# Patient Record
Sex: FEMALE | Race: WHITE | NOT HISPANIC OR LATINO | Employment: FULL TIME | URBAN - METROPOLITAN AREA
[De-identification: names, ages, dates, MRNs, and addresses within clinical notes are randomized per-mention and may not be internally consistent; named-entity substitution may affect disease eponyms.]

---

## 2023-10-30 ENCOUNTER — OCCUPATIONAL MEDICINE (OUTPATIENT)
Dept: URGENT CARE | Facility: PHYSICIAN GROUP | Age: 23
End: 2023-10-30
Payer: COMMERCIAL

## 2023-10-30 ENCOUNTER — APPOINTMENT (OUTPATIENT)
Dept: RADIOLOGY | Facility: IMAGING CENTER | Age: 23
End: 2023-10-30
Attending: PHYSICIAN ASSISTANT
Payer: COMMERCIAL

## 2023-10-30 VITALS
HEART RATE: 89 BPM | WEIGHT: 206 LBS | BODY MASS INDEX: 30.51 KG/M2 | OXYGEN SATURATION: 98 % | HEIGHT: 69 IN | SYSTOLIC BLOOD PRESSURE: 158 MMHG | TEMPERATURE: 98.6 F | RESPIRATION RATE: 16 BRPM | DIASTOLIC BLOOD PRESSURE: 98 MMHG
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
## 2023-10-30 DIAGNOSIS — M79.641 HAND PAIN, RIGHT: ICD-10-CM

## 2023-10-30 DIAGNOSIS — S62.324A CLOSED DISPLACED FRACTURE OF SHAFT OF FOURTH METACARPAL BONE OF RIGHT HAND, INITIAL ENCOUNTER: ICD-10-CM

## 2023-10-30 DIAGNOSIS — Z02.6 ENCOUNTER RELATED TO WORKER'S COMPENSATION CLAIM: ICD-10-CM

## 2023-10-30 PROCEDURE — 3080F DIAST BP >= 90 MM HG: CPT | Performed by: PHYSICIAN ASSISTANT

## 2023-10-30 PROCEDURE — 3077F SYST BP >= 140 MM HG: CPT | Performed by: PHYSICIAN ASSISTANT

## 2023-10-30 PROCEDURE — 73130 X-RAY EXAM OF HAND: CPT | Mod: TC,RT | Performed by: PHYSICIAN ASSISTANT

## 2023-10-30 PROCEDURE — 99203 OFFICE O/P NEW LOW 30 MIN: CPT | Performed by: PHYSICIAN ASSISTANT

## 2023-10-30 RX ORDER — EPINEPHRINE 0.3 MG/.3ML
INJECTION SUBCUTANEOUS
COMMUNITY
Start: 2022-12-23 | End: 2024-12-22

## 2023-10-30 NOTE — LETTER
[facility]  [address] [place] [address]. [address] - [place] [address]  Phone:  [phone] - Fax:  [phone]   [facility] Progress Report and Disability Certification  Date of Service: 10/30/2023   No Show:  No  Date / Time of Next Visit: 11/3/2023   Claim Information   Patient Name: Inez Hoffman  Claim Number:     Employer:  [employer] Date of Injury: 10/30/2023     Insurer / TPA:  ID / SSN:     Occupation: Corpsmember Diagnosis: Diagnoses of Hand pain, right, Accident caused by powered hand tool, initial encounter, Encounter related to worker's compensation claim, and Closed displaced fracture of shaft of fourth metacarpal bone of right hand, initial encounter were pertinent to this visit.    Medical Information   Related to Industrial Injury? Yes   Subjective Complaints:  DOI 10/30/23  INDIRA: This is a pleasant 23-year-old female who was using an auger drill power tool, hit a rock, when the atrial jerked in her hand, now pain to dorsum of right hand over the third fourth and fifth metacarpals.  She reports mild soft tissue swelling.  She denies pain at the wrist.  She reports mild numbness to the fourth and fifth digit.  She has no history of injury to the hand or wrist on the side.  She has no other appointment.  No open lacerations.   Objective Findings: Tenderness palpation over the third fourth and fifth metacarpals.  No tenderness to the MCP joint or fingers.   strength mildly diminished on the right.  Distal neurovascular intact.  Mild soft tissue swelling noted over dorsum of hand.  No wrist tenderness.  No snuffbox tenderness.  Full range of motion to wrist.   Pre-Existing Condition(s):     Assessment:   Initial Visit    Status: Additional Care Required  Permanent Disability:No    Plan: Medication    Diagnostics: X-ray    Comments:    RADIOLOGY RESULTS  DX-HAND 3+ RIGHT    Result Date: 10/30/2023  10/30/2023 6:00 PM HISTORY/REASON FOR  EXAM:  Pain/Deformity Following Trauma TECHNIQUE/EXAM DESCRIPTION AND NUMBER OF VIEWS:  3 views of the RIGHT hand. COMPARISON: None FINDINGS: Acute displaced oblique fracture of the fourth metacarpal. No joint osteoarthritis.     Acute displaced oblique fracture of the fourth metacarpal.                      Disability Information   Status: Released to Restricted Duty    From:  10/30/2023  Through: 11/3/2023 Restrictions are: Temporary   Physical Restrictions   Sitting:    Standing:    Stooping:    Bending:      Squatting:    Walking:    Climbing:    Pushin hrs/day  Comments:Right hand   Pullin hrs/day  Comments:Right hand Other:    Reaching Above Shoulder (L):   Reaching Above Shoulder (R):       Reaching Below Shoulder (L):    Reaching Below Shoulder (R):      Not to exceed Weight Limits   Carrying(hrs):   Weight Limit(lb): < or = to 10 pounds  Comments:Right hand Lifting(hrs):   Weight  Limit(lb): < or = to 10 pounds  Comments:Right hand   Comments: Patient placed in ulnar gutter splint  No use of right hand  Patient lives in [facility], would be a more appropriate for follow-up in [place] closer to home  Ice elevate, NSAIDs/Tylenol as needed for pain  Urgent referral placed orthopedics  Urgent referral placed to occupational medicine  Patient encouraged to contact HR at employer to discuss care.    Repetitive Actions   Hands: i.e. Fine Manipulations from [place] hrs/day   Feet: i.e. Operating Foot Controls:     Driving / Operate Machinery:     Health Care Provider’s Original or Electronic Signature  Perlita Sierra P.A.-C. Health Care Provider’s Original or Electronic Signature    Hugo Ham DO MPH     Clinic Name / Location: [address]  [place] [address] Clinic Phone Number: Dept: [phone]   Appointment Time: 3:40 Pm Visit Start Time: 5:34 PM   Check-In Time:  3:52 Pm Visit Discharge Time: 7:28 PM   Original-Treating  Physician or Chiropractor    Page 2-Insurer/TPA    Page 3-Employer    Page 4-Employee

## 2023-10-30 NOTE — LETTER
"    EMPLOYEE’S CLAIM FOR COMPENSATION/ REPORT OF INITIAL TREATMENT  FORM C-4  PLEASE TYPE OR PRINT    EMPLOYEE’S CLAIM - PROVIDE ALL INFORMATION REQUESTED   First Name                    WAQAS Wilye Last Name  Yasmin Birthdate                    2000                Sex  []M  []F Claim Number (Insurer’s Use Only)     Home Address  [address] Age  23 y.o. 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 (Be specific and answer in detail. Use additional sheet if necessary)  using auger and lost control   If you believe that you have an occupational disease, when did you first have knowledge of the disability and its relationship to your employment?  n/A Witnesses to the Accident (if applicable)  Mamadou primervarbk      Nature of Injury or Occupational Disease  Sprain  Part(s) of Body Injured or Affected  Hand (R) N/A N/A    I CERTIFY THAT THE ABOVE IS TRUE AND CORRECT TO T HE BEST OF MY " KNOWLEDGE AND THAT I HAVE PROVIDED THIS INFORMATION IN ORDER TO OBTAIN THE BENEFITS OF [place], OR CHAPTER [place]).  I HEREBY AUTHORIZE ANY PHYSICIAN, CHIROPRACTOR, SURGEON, PRACTITIONER OR ANY OTHER PERSON, ANY HOSPITAL, INCLUDING [facility] OR [facility], ANY  MEDICAL SERVICE ORGANIZATION, ANY INSURANCE COMPANY, OR OTHER INSTITUTION OR ORGANIZATION TO RELEASE TO EACH OTHER, ANY MEDICAL OR OTHER INFORMATION, INCLUDING BENEFITS PAID OR PAYABLE, PERTINENT TO THIS INJURY OR DISEASE, EXCEPT INFORMATION RELATIVE TO DIAGNOSIS, TREATMENT AND/OR COUNSELING FOR AIDS, PSYCHOLOGICAL CONDITIONS, ALCOHOL OR CONTROLLED SUBSTANCES, FOR WHICH I MUST GIVE SPECIFIC AUTHORIZATION.  A PHOTOSTAT OF THIS AUTHORIZATION SHALL BE VALID AS THE ORIGINAL.     Date 10/30/2023   [facility] [place] Employee’s Original or  *Electronic Signature   THIS REPORT MUST BE COMPLETED AND MAILED WITHIN 3 WORKING DAYS OF TREATMENT   Place  [facility]    Name of Facility  [facility]   Date 10/30/2023 Diagnosis and Description of Injury or Occupational Disease  (M79.641) Hand pain, right  (W29.8XXA) Accident caused by powered hand tool, initial encounter  (Z02.6) Encounter related to worker's compensation claim  (S62.324A) Closed displaced fracture of shaft of fourth metacarpal bone of right hand, initial encounter  Diagnoses of Hand pain, right, Accident caused by powered hand tool, initial encounter, Encounter related to worker's compensation claim, and Closed displaced fracture of shaft of fourth metacarpal bone of right hand, initial encounter were pertinent to this visit. Is there evidence that the injured employee was under the influence of alcohol and/or another controlled substance at the time of accident?  []No  [] Yes (if yes, please explain)   Hour 5:34 PM  No   Treatment: Patient placed in ulnar gutter splint  No use of  right hand  Patient lives in [facility], would be a more appropriate for follow-up in [place] closer to home  Ice elevate, NSAIDs/Tylenol as needed for pain  Urgent referral placed orthopedics  Urgent referral placed to occupational medicine  Patient encouraged to contact HR at employer to discuss care.    Have you advised the patient to remain off work five days or more?   [] Yes Indicate dates: From   To    []No If no, is the injured employee capable of: [] full duty [] modified duty                                                             No  Yes  If modified duty, specify any limitations / restrictions:  See d39                                                                                                                                                                                                                                                                                                                                                                                                               X-Ray Findings: Positive    From information given by the employee, together with medical evidence, can you directly connect this injury or occupational disease as job incurred?  []Yes   [] No Yes    Is additional medical care by a physician indicated? []Yes [] No  Yes    Do you know of any previous injury or disease contributing to this condition or occupational disease? []Yes [] No (Explain if yes)                          No   Date  10/30/2023 Print Health Care Provider’s Name  Perlita Sierra P.A.-C. I certify that the employer’s copy of  this form was delivered to the employer on:   Address  [address] [place] [address]. [address] INSURER'S USE ONLY                       [facility] Zip  [address] Provider’s Tax ID Number  [id number]   Telephone  Dept: [phone]    Health Care Provider’s Original or Electronic Signature  e-SignPERLITA SIERRA P.A.-C. Degree (DO JENNIFER,  "DUGLAS BONILLA,APRN)  DUGLAS  Choose (if applicable)      ORIGINAL - TREATING HEALTHCARE PROVIDER PAGE 2 - INSURER/TPA PAGE 3 - EMPLOYER PAGE 4 - EMPLOYEE             Form C-4 (rev.08/23)        BRIEF DESCRIPTION OF RIGHTS AND BENEFITS  (Pursuant to [place] 616[place].050)    Notice of Injury or Occupational Disease (Incident Report Form C-1): If an injury or occupational disease (OD) arises out of and in the course of employment, you must provide written notice to your employer as soon as practicable, but no later than 7 days after the accident or OD. Your employer shall maintain a sufficient supply of the required forms.    Claim for Compensation (Form C-4): If medical treatment is sought, the form C-4 is available at the place of initial treatment. A completed \"Claim for Compensation\" (Form C-4) must be filed within 90 days after an accident or OD. The treating physician or chiropractor must, within 3 working days after treatment, complete and mail to the employer, the employer's insurer and third-party , the Claim for Compensation.    Medical Treatment: If you require medical treatment for your on-the-job injury or OD, you may be required to select a physician or chiropractor from a list provided by your workers’ compensation insurer, if it has contracted with an Organization for Managed Care (MCO) or Preferred Provider Organization (PPO) or providers of health care. If your employer has not entered into a contract with an MCO or PPO, you may select a physician or chiropractor from the Panel of Physicians and Chiropractors. Any medical costs related to your industrial injury or OD will be paid by your insurer.    Temporary Total Disability (TTD): If your doctor has certified that you are unable to work for a period of at least 5 consecutive days, or 5 cumulative days in a 20-day period, or places restrictions on you that your employer does not accommodate, you may be entitled to TTD compensation.    Temporary Partial " Disability (TPD): If the wage you receive upon reemployment is less than the compensation for TTD to which you are entitled, the insurer may be required to pay you TPD compensation to make up the difference. TPD can only be paid for a maximum of 24 months.    Permanent Partial Disability (PPD): When your medical condition is stable and there is an indication of a PPD as a result of your injury or OD, within 30 days, your insurer must arrange for an evaluation by a rating physician or chiropractor to determine the degree of your PPD. The amount of your PPD award depends on the date of injury, the results of the PPD evaluation, your age and wage.    Permanent Total Disability (PTD): If you are medically certified by a treating physician or chiropractor as permanently and totally disabled and have been granted a PTD status by your insurer, you are entitled to receive monthly benefits not to exceed 66 2/3% of your average monthly wage. The amount of your PTD payments is subject to reduction if you previously received a lump-sum PPD award.    Vocational Rehabilitation Services: You may be eligible for vocational rehabilitation services if you are unable to return to the job due to a permanent physical impairment or permanent restrictions as a result of your injury or occupational disease.    Transportation and Per Alis Reimbursement: You may be eligible for travel expenses and per alis associated with medical treatment.    Reopening: You may be able to reopen your claim if your condition worsens after claim closure.     Appeal Process: If you disagree with a written determination issued by the insurer or the insurer does not respond to your request, you may appeal to the Department of Administration, , by following the instructions contained in your determination letter. You must appeal the determination within 70 days from the date of the determination letter at [address], [address], [place], [place] [address], or [address], [address], [place], [place] [address]. If you disagree with the  decision, you may appeal to the Department of Administration, . You must file your appeal within 30 days from the date of the  decision letter at [address], [address], [place], [place] [address], or [address], [address], [place], [place] [address]. If you disagree with a decision of an , you may file a petition for judicial review with the District Court. You must do so within 30 days of the Appeal Officer’s decision. You may be represented by an  at your own expense or you may contact the [facility] for possible representation.    [place]  for Injured Workers (NAIW): If you disagree with a  decision, you may request that NAIW represent you without charge at an  Hearing. For information regarding denial of benefits, you may contact the [facility] at: [address], [address], [place] [address], [phone], or [address], [address], [place] [address], [phone]    To File a Complaint with the Division: If you wish to file a complaint with the  of the Division of Industrial Relations (DIR),  please contact the Workers’ Compensation Section, [address], [place], [place] [address], telephone [phone], or [address], [address], [place], [place] [address], telephone [phone].    For assistance with Workers’ Compensation Issues: You may contact the [facility] Office for Consumer Health Assistance, [address], [address], [place] [address], Toll Free 1-143.252.7986, Web site: http://[facility].[place].gov/Programs/RUDDY E-mail: ruddy@[facility].[place].gov              __________________________________________________________________                                    _________________            Employee Name /  Signature                                                                                                                            Date                                                                                                                                                                                                                              D-2 (rev. 10/20)

## 2023-10-31 NOTE — PROGRESS NOTES
Subjective:   Inez Hoffman is a 23 y.o. female who presents for Work-Related Injury (WC [employer] [place] [employer]/R hand injury/DOI 10/30/23. Pt was using a power tool when she hit a rock and the power tool jerked her hand and injured her. Swelling and limited ROM.)         HPI      ROS    Medications:  EPINEPHrine Soaj  sertraline Tabs    Allergies:             Patient has no known allergies.    Surgical History:       No past surgical history on file.    Past Social Hx:  Inez Hoffman  reports that she has never smoked. She has never used smokeless tobacco. She reports current alcohol use.     Past Family Hx:   Inez Hoffman family history is not on file.       Problem list, medications, and allergies reviewed by myself today in Epic.     Objective:     There were no vitals taken for this visit.    Physical Exam    Assessment/Plan:     Diagnosis and Associated Orders:     There are no diagnoses linked to this encounter.      Comments/MDM:    I personally reviewed prior external notes and test results pertinent to today's visit. Supportive care, natural history, differential diagnoses, and indications for immediate follow-up discussed. Return to clinic or go to ED if symptoms worsen or persist.  Red flag symptoms discussed.  Patient/Parent/Guardian voices understanding. Follow-up with your primary care provider in 3-5 days.  All side effects of medication discussed including allergic response, GI upset, tendon injury, rash, sedation etc    Please note that this dictation was created using voice recognition software. I have made a reasonable attempt to correct obvious errors, but I expect that there are errors of grammar and possibly content that I did not discover before finalizing the note.    This note was electronically signed by Perlita Sierra PA-C

## 2023-10-31 NOTE — PROGRESS NOTES
"Subjective     Inez Hoffman is a 23 y.o. female who presents with Work-Related Injury (WC [employer] [place] [employer]/R hand injury/DOI 10/30/23. Pt was using a power tool when she hit a rock and the power tool jerked her hand and injured her. Swelling and limited ROM.)      DOI 10/30/23  INDIRA: This is a pleasant 23-year-old female who was using an auger drill power tool, hit a rock, when the atrial jerked in her hand, now pain to dorsum of right hand over the third fourth and fifth metacarpals.  She reports mild soft tissue swelling.  She denies pain at the wrist.  She reports mild numbness to the fourth and fifth digit.  She has no history of injury to the hand or wrist on the side.  She has no other appointment.  No open lacerations.                Objective     BP (!) 158/98 (BP Location: Left arm, Patient Position: Sitting, BP Cuff Size: Adult)   Pulse 89   Temp 37 °C (98.6 °F) (Temporal)   Resp 16   Ht 1.753 m (5' 9\")   Wt 93.4 kg (206 lb)   SpO2 98%   BMI 30.42 kg/m²      Physical Exam    Tenderness palpation over the third fourth and fifth metacarpals.  No tenderness to the MCP joint or fingers.   strength mildly diminished on the right.  Distal neurovascular intact.  Mild soft tissue swelling noted over dorsum of hand.  No wrist tenderness.  No snuffbox tenderness.  Full range of motion to wrist.          RADIOLOGY RESULTS  DX-HAND 3+ RIGHT    Result Date: 10/30/2023  10/30/2023 6:00 PM HISTORY/REASON FOR EXAM:  Pain/Deformity Following Trauma TECHNIQUE/EXAM DESCRIPTION AND NUMBER OF VIEWS:  3 views of the RIGHT hand. COMPARISON: None FINDINGS: Acute displaced oblique fracture of the fourth metacarpal. No joint osteoarthritis.     Acute displaced oblique fracture of the fourth metacarpal.           Assessment & Plan        1. Hand pain, right    - DX-HAND 3+ RIGHT  - Referral to Orthopedics  - Referral to Occupational Medicine    2. Accident caused by powered hand tool, initial " encounter    - Referral to Orthopedics  - Referral to Occupational Medicine    3. Encounter related to worker's compensation claim    - Referral to Orthopedics  - Referral to Occupational Medicine    4. Closed displaced fracture of shaft of fourth metacarpal bone of right hand, initial encounter    - Referral to Orthopedics  - Referral to Occupational Medicine        Patient placed in ulnar gutter splint  No use of right hand  Patient lives in [facility], would be a more appropriate for follow-up in [place] closer to home  Ice elevate, NSAIDs/Tylenol as needed for pain  Urgent referral placed orthopedics  Urgent referral placed to occupational medicine  Patient encouraged to contact HR at employer to discuss care.